# Patient Record
Sex: FEMALE | Race: WHITE | NOT HISPANIC OR LATINO | Employment: OTHER | URBAN - METROPOLITAN AREA
[De-identification: names, ages, dates, MRNs, and addresses within clinical notes are randomized per-mention and may not be internally consistent; named-entity substitution may affect disease eponyms.]

---

## 2023-11-09 ENCOUNTER — OFFICE VISIT (OUTPATIENT)
Dept: ORTHOPEDIC SURGERY | Facility: HOSPITAL | Age: 73
End: 2023-11-09
Payer: MEDICARE

## 2023-11-09 DIAGNOSIS — M25.812 SHOULDER IMPINGEMENT, LEFT: Primary | ICD-10-CM

## 2023-11-09 DIAGNOSIS — M75.22 BICEPS TENDINITIS OF LEFT SHOULDER: ICD-10-CM

## 2023-11-09 PROCEDURE — 20550 NJX 1 TENDON SHEATH/LIGAMENT: CPT | Performed by: ORTHOPAEDIC SURGERY

## 2023-11-09 PROCEDURE — 99213 OFFICE O/P EST LOW 20 MIN: CPT | Performed by: ORTHOPAEDIC SURGERY

## 2023-11-09 PROCEDURE — 76942 ECHO GUIDE FOR BIOPSY: CPT | Performed by: ORTHOPAEDIC SURGERY

## 2023-11-09 PROCEDURE — 1159F MED LIST DOCD IN RCRD: CPT | Performed by: ORTHOPAEDIC SURGERY

## 2023-11-09 PROCEDURE — 1160F RVW MEDS BY RX/DR IN RCRD: CPT | Performed by: ORTHOPAEDIC SURGERY

## 2023-11-09 PROCEDURE — 20611 DRAIN/INJ JOINT/BURSA W/US: CPT | Performed by: ORTHOPAEDIC SURGERY

## 2023-11-09 PROCEDURE — 1036F TOBACCO NON-USER: CPT | Performed by: ORTHOPAEDIC SURGERY

## 2023-11-10 RX ORDER — TRIAMCINOLONE ACET/0.9%NACL/PF 40 MG/ML
10 VIAL (ML) INJECTION ONCE
Status: SHIPPED | OUTPATIENT
Start: 2023-11-10

## 2023-11-10 RX ORDER — METHYLPREDNISOLONE ACETATE 40 MG/ML
40 INJECTION, SUSPENSION INTRA-ARTICULAR; INTRALESIONAL; INTRAMUSCULAR; SOFT TISSUE ONCE
Status: SHIPPED | OUTPATIENT
Start: 2023-11-10

## 2023-11-10 ASSESSMENT — ENCOUNTER SYMPTOMS
JOINT SWELLING: 1
EYE DISCHARGE: 0
COLOR CHANGE: 0
TROUBLE SWALLOWING: 0
SHORTNESS OF BREATH: 0
WHEEZING: 0
CHILLS: 0
FEVER: 0

## 2023-11-10 NOTE — PROGRESS NOTES
Reason for Appointment  Recurrent L shoulder pain     History of Present Illness  Patient is a 73 y.o. female here today for follow-up evaluation of recurrent left shoulder pain. She had a previous subacromial injection 11 months ago that did give her good relief up until a few months ago. Shoulder pain has slowly returned and is worse with lifting and overhead activity. She is very active. She has anterior and lateral sided pain. No numbness down the arm. No other changes in her PMH, allergies, or medications     History reviewed. No pertinent past medical history.    History reviewed. No pertinent surgical history.    Medication Documentation Review Audit       Reviewed by Tessy Greene PA-C (Physician Assistant) on 11/10/23 at 0718      Medication Order Taking? Sig Documenting Provider Last Dose Status            No Medications to Display                                   Allergies   Allergen Reactions    Penicillins GI Upset and Unknown       Review of Systems   Constitutional:  Negative for chills and fever.   HENT:  Positive for hearing loss. Negative for trouble swallowing.    Eyes:  Negative for discharge.   Respiratory:  Negative for shortness of breath and wheezing.    Cardiovascular:  Negative for chest pain.   Musculoskeletal:  Positive for joint swelling.   Skin:  Negative for color change and pallor.   All other systems reviewed and are negative.    Exam   On exam the left shoulder is lacking about 10 degrees of full active forward flexion. She has positive impingement signs and a functional deltoid. Good cuff strength with resisted external rotation. Tender anteriorly over the biceps tendon sheath. Pain with an open palm resistance test. Good pulses and sensation in the upper extremity    Assessment   Left shoulder impingement  Left biceps tendinitis    Plan   We sterilely injected under US guidance Kenalog and lidocaine into the left shoulder subacromial space and depo-medrol and lidocaine into  the left biceps tendon sheath. Patient understands the small risk of infection and the signs to look for as well as flare reaction. Hopefully these give her good relief. She can follow up with us as needed    Written by Tessy Marsh saw, evaluated, and treated the patient with the PA

## 2024-05-29 ENCOUNTER — OFFICE VISIT (OUTPATIENT)
Dept: ORTHOPEDIC SURGERY | Facility: HOSPITAL | Age: 74
End: 2024-05-29
Payer: MEDICARE

## 2024-05-29 ENCOUNTER — HOSPITAL ENCOUNTER (OUTPATIENT)
Dept: RADIOLOGY | Facility: HOSPITAL | Age: 74
Discharge: HOME | End: 2024-05-29
Payer: MEDICARE

## 2024-05-29 DIAGNOSIS — M25.552 PAIN IN LEFT HIP: ICD-10-CM

## 2024-05-29 DIAGNOSIS — M76.892 HIP ABDUCTOR TENDONITIS, LEFT: Primary | ICD-10-CM

## 2024-05-29 PROCEDURE — 1159F MED LIST DOCD IN RCRD: CPT | Performed by: FAMILY MEDICINE

## 2024-05-29 PROCEDURE — 99213 OFFICE O/P EST LOW 20 MIN: CPT | Performed by: FAMILY MEDICINE

## 2024-05-29 PROCEDURE — 73502 X-RAY EXAM HIP UNI 2-3 VIEWS: CPT | Mod: LEFT SIDE | Performed by: RADIOLOGY

## 2024-05-29 PROCEDURE — 99203 OFFICE O/P NEW LOW 30 MIN: CPT | Performed by: FAMILY MEDICINE

## 2024-05-29 PROCEDURE — 73502 X-RAY EXAM HIP UNI 2-3 VIEWS: CPT | Mod: LT

## 2024-05-29 RX ORDER — ATORVASTATIN CALCIUM 10 MG/1
10 TABLET, FILM COATED ORAL DAILY
COMMUNITY
Start: 2024-03-26

## 2024-05-29 RX ORDER — GABAPENTIN 100 MG/1
200 CAPSULE ORAL NIGHTLY
COMMUNITY
Start: 2024-03-14

## 2024-05-29 ASSESSMENT — PAIN - FUNCTIONAL ASSESSMENT: PAIN_FUNCTIONAL_ASSESSMENT: 0-10

## 2024-05-29 NOTE — PROGRESS NOTES
Sports Medicine Office Note    Today's Date:  05/29/2024     HPI: Hannah Wellington is a 74 y.o. retired and affordable housing who presents today for left hip pain.    5/29/2021: She has posterior lateral hip pain that she states has been since childhood.  No trauma, but she has been managing it with stretches and exercises, she feels better.  Is the first time she is having her evaluated by a medical professional.  She is very active, she paints, gardens, and runs experience up to 2 miles a day.  This is not affected any of his activities, but the recent housework has aggravated this pain which is why she is here today.  She has tried hip flexor exercises, but no medications.  She states sometimes she does get bilateral low back pain, denies any numbness or tingling.    Physical Examination:     The Left hip and pelvis are without obvious signs of acute bony deformity or instability.  Tenderness over the gluteal fossa.  Active and passive range of motion are full and painless. Log roll is negative. Straight leg raise test is negative. Yordy is negative. Crossover is negative. Hip strength is strong as compared to the opposite hip. The opposite hip is otherwise nontender and stable. Gait is non-antalgic and tandem.    Imaging:  Radiographs of the left hip were reviewed and revealed minimal osteoarthritic changes of the bilateral hips.  The studies were reviewed by Dr. Dougherty in the office today.    Problem List Items Addressed This Visit    None  Visit Diagnoses         Codes    Hip abductor tendonitis, left    -  Primary M76.892    Relevant Orders    Referral to Physical Therapy    Pain in left hip     M25.552    Relevant Orders    XR hip left with pelvis when performed 2 or 3 views    Referral to Physical Therapy            Assessment and Plan:     We reviewed the exam and x-ray findings and discussed the conservative and surgical treatment options. We agreed to treat her with with  gluteal strain/tendinitis  maximizing prescription doses of Voltaren gel and formal physical therapy.  Home exercise program given in the meantime.  She should follow-up as needed.    **This note was dictated using Dragon speech recognition software and was not corrected for spelling or grammatical errors**.    Derick Lorenz DO  Sports Medicine Fellow  [facility] [place]

## 2024-06-27 ENCOUNTER — OFFICE VISIT (OUTPATIENT)
Dept: ORTHOPEDIC SURGERY | Facility: HOSPITAL | Age: 74
End: 2024-06-27
Payer: MEDICARE

## 2024-06-27 DIAGNOSIS — M75.22 BICEPS TENDINITIS OF LEFT SHOULDER: ICD-10-CM

## 2024-06-27 DIAGNOSIS — M25.812 SHOULDER IMPINGEMENT, LEFT: Primary | ICD-10-CM

## 2024-06-27 PROCEDURE — 1159F MED LIST DOCD IN RCRD: CPT | Performed by: ORTHOPAEDIC SURGERY

## 2024-06-27 PROCEDURE — 76942 ECHO GUIDE FOR BIOPSY: CPT | Performed by: ORTHOPAEDIC SURGERY

## 2024-06-27 PROCEDURE — 2500000004 HC RX 250 GENERAL PHARMACY W/ HCPCS (ALT 636 FOR OP/ED): Performed by: ORTHOPAEDIC SURGERY

## 2024-06-27 PROCEDURE — 20550 NJX 1 TENDON SHEATH/LIGAMENT: CPT | Mod: LT | Performed by: ORTHOPAEDIC SURGERY

## 2024-06-27 PROCEDURE — 2500000005 HC RX 250 GENERAL PHARMACY W/O HCPCS: Performed by: ORTHOPAEDIC SURGERY

## 2024-06-27 PROCEDURE — 1160F RVW MEDS BY RX/DR IN RCRD: CPT | Performed by: ORTHOPAEDIC SURGERY

## 2024-06-27 PROCEDURE — 99213 OFFICE O/P EST LOW 20 MIN: CPT | Performed by: ORTHOPAEDIC SURGERY

## 2024-06-27 PROCEDURE — 20611 DRAIN/INJ JOINT/BURSA W/US: CPT | Mod: LT | Performed by: ORTHOPAEDIC SURGERY

## 2024-06-27 PROCEDURE — 1036F TOBACCO NON-USER: CPT | Performed by: ORTHOPAEDIC SURGERY

## 2024-06-27 PROCEDURE — 1125F AMNT PAIN NOTED PAIN PRSNT: CPT | Performed by: ORTHOPAEDIC SURGERY

## 2024-06-27 ASSESSMENT — PAIN SCALES - GENERAL: PAINLEVEL_OUTOF10: 3

## 2024-06-27 ASSESSMENT — PAIN - FUNCTIONAL ASSESSMENT: PAIN_FUNCTIONAL_ASSESSMENT: 0-10

## 2024-06-28 RX ORDER — METHYLPREDNISOLONE ACETATE 40 MG/ML
30 INJECTION, SUSPENSION INTRA-ARTICULAR; INTRALESIONAL; INTRAMUSCULAR; SOFT TISSUE
Status: COMPLETED | OUTPATIENT
Start: 2024-06-27 | End: 2024-06-27

## 2024-06-28 RX ORDER — LIDOCAINE HYDROCHLORIDE 10 MG/ML
3 INJECTION INFILTRATION; PERINEURAL
Status: COMPLETED | OUTPATIENT
Start: 2024-06-27 | End: 2024-06-27

## 2024-06-28 RX ORDER — LIDOCAINE HYDROCHLORIDE 10 MG/ML
2 INJECTION INFILTRATION; PERINEURAL
Status: COMPLETED | OUTPATIENT
Start: 2024-06-27 | End: 2024-06-27

## 2024-06-28 ASSESSMENT — ENCOUNTER SYMPTOMS
SHORTNESS OF BREATH: 0
ARTHRALGIAS: 1
JOINT SWELLING: 0
SINUS PAIN: 0
FEVER: 0
WHEEZING: 0
CHILLS: 0
TROUBLE SWALLOWING: 0
EYE DISCHARGE: 0
WOUND: 0

## 2024-06-28 NOTE — PROGRESS NOTES
Reason for Appointment  Chief Complaint   Patient presents with    Left Shoulder - Injections     History of Present Illness  Patient is a 74 y.o. female here today for follow-up evaluation of recurrent left shoulder pain.  She had injections back in November that did give her good relief up until a few weeks ago.  Pain has returned over the anterior and lateral aspects of the shoulder worse with overhead activities and lifting.  She is still very active and is not interested in any surgery.  No other changes in her past medical history, allergies, or medications.     History reviewed. No pertinent past medical history.    History reviewed. No pertinent surgical history.    Medication Documentation Review Audit       Reviewed by Tessy Grenee PA-C (Physician Assistant) on 06/28/24 at 0751      Medication Order Taking? Sig Documenting Provider Last Dose Status   atorvastatin (Lipitor) 10 mg tablet [id number] Yes Take 1 tablet (10 mg) by mouth once daily. Historical Provider, MD Taking Active   gabapentin (Neurontin) 100 mg capsule [id number] Yes Take 2 capsules (200 mg) by mouth once daily at bedtime. Historical Provider, MD Taking Active   methylPREDNISolone acetate (DEPO-Medrol) injection 40 mg [id number]   Tessy Greene PA-C  Active   triamcinol ac (PF) in 0.9%NaCl (KENALOG) injection 10 mg [id number]   Tessy Greene PA-C  Active                    Allergies   Allergen Reactions    Penicillins GI Upset and Unknown       Review of Systems   Constitutional:  Negative for chills and fever.   HENT:  Negative for hearing loss, sinus pain and trouble swallowing.    Eyes:  Negative for discharge.   Respiratory:  Negative for shortness of breath and wheezing.    Cardiovascular:  Negative for chest pain.   Musculoskeletal:  Positive for arthralgias. Negative for joint swelling.   Skin:  Negative for rash and wound.   All other systems reviewed and are negative.    Exam   On exam the left shoulder shows mild pain  with full active forward flexion.  She has markedly positive impingement signs on the left but fairly good cuff strength with resisted external rotation.  Tenderness anteriorly over the left biceps tendon sheath.  Deltoid is functional.  Pain with an open palm resistance test.  Good pulses and sensation in the upper extremity.    Assessment   Left shoulder impingement  Left biceps tendinitis    Plan   She would like repeat injections today, we sterilely injected under ultrasound guidance Kenalog and lidocaine into the left shoulder subacromial space and Depo-Medrol lidocaine into the left biceps tendon sheath.  Patient understands the small risk of infection and the signs look for as well as flare action.  Hopefully these give her good relief.  She can follow-up with us as needed.    L Inj/Asp: L subacromial bursa on 6/27/2024 1:53 PM  Indications: pain  Details: 22 G needle, ultrasound-guided  Medications: 3 mL lidocaine 10 mg/mL (1 %); 30 mg triamcinolone acetonide 10 mg/mL  Outcome: tolerated well, no immediate complications    After discussing the risks and benefits of the procedure with proceeded with an injection.  Using ultrasound guidance we identified the acromion, humeral head and the subacromial bursa, images obtained. We then sterilely injected the left subacrominal space with a mixture of 30 mg of Kenalog and 2 cc of 1 % lidocaine. Pt tolerated the procedure well without any adverse reactions.   Procedure, treatment alternatives, risks and benefits explained, specific risks discussed. Consent was given by the patient. Immediately prior to procedure a time out was called to verify the correct patient, procedure, equipment, support staff and site/side marked as required. Patient was prepped and draped in the usual sterile fashion.       Tendon Sheath Injection: left long head of biceps tendon sheath on 6/27/2024 1:54 PM  Indications: pain  Details: 25 G needle, ultrasound-guided  Medications: 2 mL  lidocaine 10 mg/mL (1 %); 30 mg methylPREDNISolone acetate 40 mg/mL  Outcome: tolerated well, no immediate complications    After discussing the risks and benefits of the procedure with proceeded with an injection. Using ultrasound guidance we identified the greater and lesser tuberosities and the biceps tendons sheath, images saved. We then sterilely injected the left biceps tendon sheath with a mixture of 30 mg of Depo-Medrol and 2 cc of 1 % lidocaine. Pt tolerated the procedure well without any adverse reactions    Procedure, treatment alternatives, risks and benefits explained, specific risks discussed. Consent was given by the patient. Immediately prior to procedure a time out was called to verify the correct patient, procedure, equipment, support staff and site/side marked as required. Patient was prepped and draped in the usual sterile fashion.       Written by Tessy Marsh saw, evaluated, and treated the patient with the PA

## 2024-07-09 ENCOUNTER — EVALUATION (OUTPATIENT)
Dept: PHYSICAL THERAPY | Facility: CLINIC | Age: 74
End: 2024-07-09
Payer: MEDICARE

## 2024-07-09 DIAGNOSIS — G89.29 CHRONIC LEFT HIP PAIN: Primary | ICD-10-CM

## 2024-07-09 DIAGNOSIS — M25.552 CHRONIC LEFT HIP PAIN: Primary | ICD-10-CM

## 2024-07-09 DIAGNOSIS — M25.552 PAIN IN LEFT HIP: ICD-10-CM

## 2024-07-09 DIAGNOSIS — M76.892 HIP ABDUCTOR TENDONITIS, LEFT: ICD-10-CM

## 2024-07-09 PROCEDURE — 97110 THERAPEUTIC EXERCISES: CPT | Mod: GP | Performed by: PHYSICAL THERAPIST

## 2024-07-09 PROCEDURE — 97161 PT EVAL LOW COMPLEX 20 MIN: CPT | Mod: GP | Performed by: PHYSICAL THERAPIST

## 2024-07-09 PROCEDURE — 97535 SELF CARE MNGMENT TRAINING: CPT | Mod: GP | Performed by: PHYSICAL THERAPIST

## 2024-07-09 ASSESSMENT — PAIN SCALES - GENERAL: PAINLEVEL_OUTOF10: 3

## 2024-07-09 ASSESSMENT — PAIN - FUNCTIONAL ASSESSMENT: PAIN_FUNCTIONAL_ASSESSMENT: 0-10

## 2024-07-09 NOTE — PROGRESS NOTES
"  Physical Therapy  Physical Therapy Orthopedic Evaluation    Patient Name: Hannah Wellington \"Colleen\"  MRN: [id number]  Today's Date: 7/9/2024  Time Calculation  Start Time: 1600  Stop Time: 1643  Time Calculation (min): 43 min    Insurance:  Visit number: 1 of [place]  Authorization info: NAN  Insurance Type: Medicare and [place]  Cert start date: 7/9/24; Cert end date: 10/7/24     General:  Reason for visit:   M25.552 (ICD-10-CM) - Pain in left hip   M76.892 (ICD-10-CM) - Hip abductor tendonitis, left     Referred by: Dr. Kristian Dougherty    Assessment: Patient presents with signs and symptoms consistent with gluteal tendinopathy L>R, resulting in limited participation in pain-free ADLs and inability to perform at their prior level of function. Pt would benefit from physical therapy to address the impairments found & listed previously in the objective section in order to return to safe and pain-free ADLs and prior level of function.       Prognosis: Good  Clinical Presentation: Stable  Eval Complexity: Low    Plan:     Planned Interventions include: therapeutic exercise, self-care home management, manual therapy, therapeutic activities, gait training, neuromuscular coordination, vasopneumatic, dry needling, aquatic therapy  Frequency: 1 x Week  Duration: 8 Weeks    Patient and/or family understands and agrees with goals and plan documented.    Current Problem:  1. Chronic left hip pain  Follow Up In Physical Therapy      2. Pain in left hip  Referral to Physical Therapy      3. Hip abductor tendonitis, left  Referral to Physical Therapy          Precautions:   Precautions  STEADI Fall Risk Score (The score of 4 or more indicates an increased risk of falling): 0  Precautions Comment: Recent parathyroid surgery      Medical History Form: Reviewed (scanned into chart)    Subjective:   Subjective   Chief Complaint: Pt presents to PT with longstanding h/o L hip pain, recently aggravated while painting outside of home, required her " "transferring in/out of window early June. Further aggravated after driving in car 8 hours 10 days later. States she could not move, hips locking up, after transferring out of car. Now presents to PT with B proximal buttock pain (R sided pain beginning yesterday). Lives very active lifestyle, including running 2 miles/d, swimming, gardening, painting. Recent visit with Dr. Dougherty, dx with gluteal strain/tendinitis, referred to PT and using voltaren gel. Recent parathyroid surgery, only precautions no swimming and lifting <5 lb.   Onset: June  NEERU: Increased activity while painting house    Current Condition:   Same    Pain:  Pain Assessment: 0-10  0-10 (Numeric) Pain Score: 3  Location: B proximal buttock   Description: \"electric\", \"locking up\"; denies n/t  Aggravating Factors:  transitional movements; standing after sitting long duration, specifically deeper seats; twisting hip movements  Relieving Factors:  Alleve, piriformis stretch, SKTC, LTR  Pain Intensity: 0/10-3/10    Relevant Information (PMH & Previous Tests/Imaging): HLD, Gabapentin (to improve sleep)  L Hip XR: Moderate osteoarthritis bilateral hips and sacroiliac joints with no acute findings left hip.   Previous Interventions/Treatments: None    Prior Level of Function (PLOF)  Patient previously independent with all ADLs  Exercise/Physical Activity: Runs intervals 2 miles on dirt track, 3x/week; walking 1 hour 4x/week, biking regularly, tennis, swimming, gardening  Work/School: No    Patients Living Environment: Reviewed and no concern    Primary Language: English    Patient's Goal(s) for Therapy: \"Learn exercises to reduce pain\"    Red Flags: Do you have any of the following? No  Fever/chills, unexplained weight changes, dizziness/fainting, unexplained change in bowel or bladder functions, unexplained malaise or muscle weakness, night pain/sweats, numbness or tingling    Objective:  Objective     Posture: PPT, rounded shoulders, iliac crest = height, " decreased knee ext R>L    Palpation: R/L PSIS, R/L glut min/piriformis    L/s AROM  Flexion: 75% (HS tightness)  Ext: 25%  SB: R 50%, L 50%  ROT: R 50%, L 50%    Hip AROM  Flexion R 125 deg (143 deg PROM); L 133 deg (142 deg PROM)  Abduction R 19 deg; L 21 deg  Extension R moderate limitations; L moderate limitations  ER R 25 deg; L 25 deg  IR R 25 deg; L 25 deg    Lower Extremity MMT  Hip Flexion R 4+/5; L 4/5  Hip Extension R 4/5; L 4-/5  Hip Abduction R 4/5; L 4-/5  Hip Adduction R 4/5; L 4-/5  Hip IR R 4/5; L 4/5  Hip ER R 4/5; L 4/5  Knee flexion R DNT; L DNT  Knee extension R 4+/5; L 4/5    LE Flexibility  HS: mod limitations R/L  RF: mod limitations R/L    Special Tests:  Scour (-)  FADIR (+)  AGUS (-)  Distraction (-)  SIJ Cluster (-)    Gait Analysis: decreased vickie, antalgic  Transfers: Increased weight shift RLE, requires increased time, requires use of UE    Balance:   SL Balance: R 20s; L 14s    Joint Mobility: diffusely hypomobile through hip and L/s    Outcome Measures:  Patient Specific Functional Scale (0 = unable to perform; 10 = able to perform activity at same level as before injury or problem)  Activity Current Follow Up Discharge   Walking 5     Tennis 0           Total score = sum of the activity scores/number of activities  Minimum detectable change (90%CI) for average score = 2 points  Minimum detectable change (90%CI) for single activity score = 3 points    EDUCATION: Home exercise program, plan of care, activity modifications, pain management, and injury pathology       Goals: Set and discussed today  Active       PT Problem       STG       Start:  07/09/24    Expected End:  08/23/24       Patient demonstrate home exercise program adherence to supplement symptom reduction and functional gains made in clinic          LTG       Start:  07/09/24    Expected End:  10/07/24       Patient will verbalize pain (0-10) <1/10 at worst to improve ADL tolerance   Patient will demonstrate gross L  hip strength 4/5 MMT to improve ease with functional mobility  Patient will perform STS transfer with appropriate LE alignment with minimal symptoms to improve ease with transfers  Pt will demonstrate MCID improvement on PSFS to demonstrate return to PLOF               Plan of care was developed with input and agreement by the patient    Treatment Performed:  HEP: Bridge, prone hip extension of table, SLR, SL clam, cat cow, piriformis stretch (fig 4 knee to opp chest)  Access Code [email or web address]    Charges:  Evaluation: low complexity  Treatment: 1 TE, 1 self care    Cindy Saeed, PT

## 2024-07-16 NOTE — PROGRESS NOTES
"Physical Therapy  Physical Therapy Treatment    Patient Name: Hannah Wellington  MRN: [id number]  Today's Date: 7/17/2024  Time Calculation  Start Time: 1330  Stop Time: 1412  Time Calculation (min): 42 min    Insurance:  Visit number: 2 of [place]  Authorization info: NAN  Insurance Type: Medicare and [place]  Cert start date: 7/9/24; Cert end date: 10/7/24      General:  Reason for visit:   M25.552 (ICD-10-CM) - Pain in left hip   M76.892 (ICD-10-CM) - Hip abductor tendonitis, left      Referred by: Dr. Kristian Dougherty    Assessment: Pt requiring moderate cuing to perform exercises with correct technique. Tolerating prescribed exercise well without increase in pain. Difficulty performing TA act without PPT/glut compensation, improving with cuing. At end of session, pt denies pain. Updating HEP and educating on performance.       Plan: Progress core/hip strength to tolerance. Add balance training.       Current Problem  1. Chronic left hip pain  Follow Up In Physical Therapy          Precautions:   Precautions  STEADI Fall Risk Score (The score of 4 or more indicates an increased risk of falling): 0  Precautions Comment: Recent parathyroid surgery (no swimming, no lifting >5 lb)    Subjective:  Subjective   Pt presents to PT without pain. Reports HEP adherence, believes the exercises are helping. No longer experiencing locking sensation. Reports continued pain with sitting long durations or bending at hip.    Pain  Pain Assessment: 0-10  0-10 (Numeric) Pain Score: 0 - No pain    Performing HEP?: Yes    Objective:  Objective   Ext lag present R/L    Treatments:  Exercise  - Upright bike 5'  - Cat cow x15  - Bridge 2x10  - SLR 2x10  - SL clam GTB 2x10  - Prone hip ext off table 2x10  - TA act 20x5\" holds  - BKFO x10 R/L  - Supine piriformis stretch x60\" R/L    HEP: Bridge, prone hip extension of table, SLR, SL clam, cat cow, piriformis stretch (fig 4 knee to opp chest)  Access Code [email or web address]    Charges: 3 TE    Cindy " Darlin, PT

## 2024-07-17 ENCOUNTER — TREATMENT (OUTPATIENT)
Dept: PHYSICAL THERAPY | Facility: CLINIC | Age: 74
End: 2024-07-17
Payer: MEDICARE

## 2024-07-17 DIAGNOSIS — M25.552 CHRONIC LEFT HIP PAIN: ICD-10-CM

## 2024-07-17 DIAGNOSIS — G89.29 CHRONIC LEFT HIP PAIN: ICD-10-CM

## 2024-07-17 PROCEDURE — 97110 THERAPEUTIC EXERCISES: CPT | Mod: GP | Performed by: PHYSICAL THERAPIST

## 2024-07-17 ASSESSMENT — PAIN SCALES - GENERAL: PAINLEVEL_OUTOF10: 0 - NO PAIN

## 2024-07-17 ASSESSMENT — PAIN - FUNCTIONAL ASSESSMENT: PAIN_FUNCTIONAL_ASSESSMENT: 0-10

## 2024-07-25 ENCOUNTER — TREATMENT (OUTPATIENT)
Dept: PHYSICAL THERAPY | Facility: CLINIC | Age: 74
End: 2024-07-25
Payer: MEDICARE

## 2024-07-25 DIAGNOSIS — G89.29 CHRONIC LEFT HIP PAIN: ICD-10-CM

## 2024-07-25 DIAGNOSIS — M25.552 CHRONIC LEFT HIP PAIN: ICD-10-CM

## 2024-07-25 PROCEDURE — 97112 NEUROMUSCULAR REEDUCATION: CPT | Mod: GP,CQ | Performed by: SPECIALIST/TECHNOLOGIST

## 2024-07-25 PROCEDURE — 97110 THERAPEUTIC EXERCISES: CPT | Mod: GP,CQ | Performed by: SPECIALIST/TECHNOLOGIST

## 2024-07-25 ASSESSMENT — PAIN SCALES - GENERAL: PAINLEVEL_OUTOF10: 0 - NO PAIN

## 2024-07-25 ASSESSMENT — PAIN - FUNCTIONAL ASSESSMENT: PAIN_FUNCTIONAL_ASSESSMENT: 0-10

## 2024-07-25 NOTE — PROGRESS NOTES
"Physical Therapy  Physical Therapy Treatment    Patient Name: Hannah Wellington  MRN: [id number]  Today's Date: 7/25/2024  Time Calculation  Start Time: 1345  Stop Time: 1430  Time Calculation (min): 45 min    Insurance:  Visit number: 3 of [place]  Authorization info: NAN  Insurance Type: Medicare and [place]  Cert start date: 7/9/24; Cert end date: 10/7/24      General:  Reason for visit:   M25.552 (ICD-10-CM) - Pain in left hip   M76.892 (ICD-10-CM) - Hip abductor tendonitis, left      Referred by: Dr. Kristian Dougherty    Current Problem  1. Chronic left hip pain  Follow Up In Physical Therapy          Precautions:   Precautions  STEADI Fall Risk Score (The score of 4 or more indicates an increased risk of falling): 0  Precautions Comment: Recent parathyroid surgery (no swimming, no lifting >5 lb)    Subjective:  Subjective   Pt presents to PT without pain. Reports HEP adherence, believes the exercises are helping. No longer experiencing locking sensation. Reports continued pain with sitting long durations or bending at hip.    Pain  Pain Assessment: 0-10  0-10 (Numeric) Pain Score: 0 - No pain    Performing HEP?: Yes    Objective:  Objective   Ext lag present R/L  + L anterior innominate   TTP L proximal rectus  Hip ext 1/2\"   MMT hip ext R 4+ L 4     Treatments:  Exercise  - Nu Step L2 5 min   - 4 kg KB narrow base & swing cw/ccw 10x R/L   - 8 kg KB ISO SB R/L 1'   - Bravo 7.5# iso rot R/L 1'   - Hip ext R/L 44#/44# 20x   - hams 20# 20x  - SS hams R/L 1'   - SB LTR 20x, Hams 20x   - TA brace 10x, Brace & MIP 10x, Brace & SLR 5x R/L   - SS R/ HF 1'     Access Code [email or web address]    Charges: TE x2, NME     Assessment: Patient tolerated intensity with mild fatigue noting good effort of strengthening.         Plan: Continue to improve core stability/strength, flexibility and balance to improve gait and ADL           Aryan Guerin, PTA  "

## 2024-08-06 ENCOUNTER — TREATMENT (OUTPATIENT)
Dept: PHYSICAL THERAPY | Facility: CLINIC | Age: 74
End: 2024-08-06
Payer: MEDICARE

## 2024-08-06 DIAGNOSIS — G89.29 CHRONIC LEFT HIP PAIN: ICD-10-CM

## 2024-08-06 DIAGNOSIS — M25.552 CHRONIC LEFT HIP PAIN: ICD-10-CM

## 2024-08-06 PROCEDURE — 97112 NEUROMUSCULAR REEDUCATION: CPT | Mod: GP,CQ | Performed by: SPECIALIST/TECHNOLOGIST

## 2024-08-06 PROCEDURE — 97110 THERAPEUTIC EXERCISES: CPT | Mod: GP,CQ | Performed by: SPECIALIST/TECHNOLOGIST

## 2024-08-06 NOTE — PROGRESS NOTES
"Physical Therapy  Physical Therapy Treatment    Patient Name: Hannah Wellington  MRN: [id number]  Today's Date: 8/6/2024  Time Calculation  Start Time: 1030  Stop Time: 1115  Time Calculation (min): 45 min    Insurance:  Visit number: 3 of [place]  Authorization info: NAN  Insurance Type: Medicare and [place]  Cert start date: 7/9/24; Cert end date: 10/7/24      General:  Reason for visit:   M25.552 (ICD-10-CM) - Pain in left hip   M76.892 (ICD-10-CM) - Hip abductor tendonitis, left      Referred by: Dr. Kristian Dougherty    Current Problem  1. Chronic left hip pain  Follow Up In Physical Therapy          Precautions:        Subjective:  Subjective   Patient arrived full weight bearing noting having a mild set back 3 nights after last session where her R back & hip tightened up.  Patient notes no pain on arrival this AM.      Pain       Performing HEP?: Yes    Objective:  Objective   Ext lag present R/L  + L anterior innominate  LLD 1/4\", with long sit 1/2\"   TTP L proximal rectus  Hip ext 1/2\"   MMT hip ext R 4+ L 4     Treatments:  Exercise  - Nu Step L2 5 min   - 1/8\" lift on R   - DBE 2x2 min  - valslide HIR/HER 15x R/L/B    - 4 kg KB narrow base & swing cw/ccw 10x R/L   - 8 kg KB ISO SB R/L 1'   - Bravo 7.5# iso rot R/L 1'   - Hip ext R/L 44#/44# 20x   - hams 20# 20x  - SS hams R/L 1'   - SB LTR 20x, Hams 20x   - TA brace 10x, Brace & MIP 10x, Brace & SLR 5x R/L   - SS R/ HF 1'     Access Code [email or web address]    Charges: TE x2, NME     Assessment: Patient tolerated intensity with less fatigue.  Patient noted less back soreness post treatment.          Plan: Continue to improve core stability/strength, flexibility and balance to improve gait and ADL           Aryan Guerin, PTA  "

## 2024-08-07 ENCOUNTER — APPOINTMENT (OUTPATIENT)
Dept: PHYSICAL THERAPY | Facility: CLINIC | Age: 74
End: 2024-08-07
Payer: MEDICARE

## 2024-08-14 ENCOUNTER — APPOINTMENT (OUTPATIENT)
Dept: PHYSICAL THERAPY | Facility: CLINIC | Age: 74
End: 2024-08-14
Payer: MEDICARE

## 2024-08-23 ENCOUNTER — APPOINTMENT (OUTPATIENT)
Dept: PHYSICAL THERAPY | Facility: CLINIC | Age: 74
End: 2024-08-23
Payer: MEDICARE

## 2024-08-28 ENCOUNTER — TREATMENT (OUTPATIENT)
Dept: PHYSICAL THERAPY | Facility: CLINIC | Age: 74
End: 2024-08-28
Payer: MEDICARE

## 2024-08-28 DIAGNOSIS — M25.552 CHRONIC LEFT HIP PAIN: ICD-10-CM

## 2024-08-28 DIAGNOSIS — G89.29 CHRONIC LEFT HIP PAIN: ICD-10-CM

## 2024-08-28 PROCEDURE — 97112 NEUROMUSCULAR REEDUCATION: CPT | Mod: GP,CQ | Performed by: SPECIALIST/TECHNOLOGIST

## 2024-08-28 PROCEDURE — 97110 THERAPEUTIC EXERCISES: CPT | Mod: GP,CQ | Performed by: SPECIALIST/TECHNOLOGIST

## 2024-08-28 ASSESSMENT — PAIN - FUNCTIONAL ASSESSMENT: PAIN_FUNCTIONAL_ASSESSMENT: 0-10

## 2024-08-28 ASSESSMENT — PAIN SCALES - GENERAL: PAINLEVEL_OUTOF10: 0 - NO PAIN

## 2024-08-28 NOTE — PROGRESS NOTES
"Physical Therapy  Physical Therapy Treatment    Patient Name: Hannah Wellington  MRN: [id number]  Today's Date: 8/28/2024  Time Calculation  Start Time: 1330  Stop Time: 1415  Time Calculation (min): 45 min    Insurance:  Visit number: 4 of [place]  Authorization info: NAN  Insurance Type: Medicare and [place]  Cert start date: 7/9/24; Cert end date: 10/7/24      General:  Reason for visit:   M25.552 (ICD-10-CM) - Pain in left hip   M76.892 (ICD-10-CM) - Hip abductor tendonitis, left      Referred by: Dr. Kristian Dougherty    Current Problem  1. Chronic left hip pain  Follow Up In Physical Therapy          Precautions:   Precautions  STEADI Fall Risk Score (The score of 4 or more indicates an increased risk of falling): 0  Precautions Comment: Recent parathyroid surgery (no swimming, no lifting >5 lb)    Subjective:  Subjective   Patient arrived full weight bearing noting she has improved in general although still can become painful when she overdoes activity.      Pain  Pain Assessment: 0-10  0-10 (Numeric) Pain Score: 0 - No pain    Performing HEP?: Yes    Objective:  Objective   Ext lag present R/L  + L anterior innominate  LLD 1/4\", with long sit 1/2\"   TTP L proximal rectus  Hip ext 1/2\"   MMT hip ext R 4+ L 4     Treatments:  Exercise  Stepper L2 5 min   - DBE 2x2 min  - valslide HIR/HER 15x R/L/B    - 4 kg KB narrow base & swing cw/ccw 10x R/L   - 8 kg KB ISO SB R/L 1'   - Bravo 7.5# iso rot R/L 1'   - Hip ext R/L 44#/44# 20x   - hams 20# 20x  - SS hams R/L 1'   - SB LTR 20x, Hams 20x   - TA brace 10x, Brace & MIP 10x, Brace & SLR 5x R/L (with pressure BFBK)  - SS R/ HF 1'     Access Code [email or web address]    Charges: TE x2, NME     Assessment: Patient tolerated increased intensity with minimal fatigue.  Patient notes she is feeling stronger.         Plan: Continue to improve core stability/strength, flexibility and balance to improve gait and ADL        Aryan Guerin, PTA  "

## 2024-08-30 ENCOUNTER — APPOINTMENT (OUTPATIENT)
Dept: PHYSICAL THERAPY | Facility: CLINIC | Age: 74
End: 2024-08-30
Payer: MEDICARE

## 2024-09-04 ENCOUNTER — APPOINTMENT (OUTPATIENT)
Dept: PHYSICAL THERAPY | Facility: CLINIC | Age: 74
End: 2024-09-04
Payer: MEDICARE

## 2024-09-11 ENCOUNTER — TREATMENT (OUTPATIENT)
Dept: PHYSICAL THERAPY | Facility: CLINIC | Age: 74
End: 2024-09-11
Payer: MEDICARE

## 2024-09-11 DIAGNOSIS — M25.552 CHRONIC LEFT HIP PAIN: ICD-10-CM

## 2024-09-11 DIAGNOSIS — G89.29 CHRONIC LEFT HIP PAIN: ICD-10-CM

## 2024-09-11 PROCEDURE — 97110 THERAPEUTIC EXERCISES: CPT | Mod: GP,CQ | Performed by: SPECIALIST/TECHNOLOGIST

## 2024-09-11 PROCEDURE — 97112 NEUROMUSCULAR REEDUCATION: CPT | Mod: GP,CQ | Performed by: SPECIALIST/TECHNOLOGIST

## 2024-09-11 ASSESSMENT — PAIN - FUNCTIONAL ASSESSMENT: PAIN_FUNCTIONAL_ASSESSMENT: 0-10

## 2024-09-11 ASSESSMENT — PAIN SCALES - GENERAL: PAINLEVEL_OUTOF10: 0 - NO PAIN

## 2024-09-11 NOTE — PROGRESS NOTES
"Physical Therapy  Physical Therapy Treatment    Patient Name: Hannah Wellington  MRN: [id number]  Today's Date: 9/11/2024  Time Calculation  Start Time: 1415  Stop Time: 1500  Time Calculation (min): 45 min    Insurance:  Visit number: 5 of [place]  Authorization info: NAN  Insurance Type: Medicare and [place]  Cert start date: 7/9/24; Cert end date: 10/7/24      General:  Reason for visit:   M25.552 (ICD-10-CM) - Pain in left hip   M76.892 (ICD-10-CM) - Hip abductor tendonitis, left      Referred by: Dr. Kritsian Dougherty    Current Problem  1. Chronic left hip pain  Follow Up In Physical Therapy          Precautions:   Precautions  STEADI Fall Risk Score (The score of 4 or more indicates an increased risk of falling): 0  Precautions Comment: Recent parathyroid surgery (no swimming, no lifting >5 lb)    Subjective:  Subjective   Patient arrived full weight bearing noting she has no pain on arrival and no soreness after last session.      Pain  Pain Assessment: 0-10  0-10 (Numeric) Pain Score: 0 - No pain    Performing HEP?: Yes    Objective:  Objective   Ext lag present R/L  No anterior innominate shift  LLD 1/4\"  TTP L proximal rectus  Hip ext 1/2\"   MMT hip ext R 4+ L 4     Treatments:  Exercise  Stepper L2 5 min   DBE 2x2 min  - valslide HIR/HER 15x R/L/B    - 4 kg KB narrow base & swing cw/ccw 10x R/L   - 8 kg KB ISO SB R/L 1'   - Bravo 7.5# iso rot R/L 1'   - Hip ext R/L 55#/55# 20x   - Hip flex R/L 22#/22# 20x  - hams 20# 20x  - SS hams R/L 1'   - SB LTR 20x, Hams 20x   - TA brace 10x, Brace & MIP 10x, Brace & SLR 5x R/L (with pressure BFBK)  - SS R/ HF 1'     Access Code [email or web address]    Charges: TE x2, NME     Assessment: Patient tolerated increased intensity with minimal fatigue.  Patient notes she is feeling stronger.         Plan: Continue to improve core stability/strength, flexibility and balance to improve gait and ADL  Patient to schedule 2 more strengthening sessions followed by a re-check with Cindy Saeed " PT      Aryan Guerin, PTA

## 2024-09-24 ENCOUNTER — OFFICE VISIT (OUTPATIENT)
Dept: ORTHOPEDIC SURGERY | Facility: HOSPITAL | Age: 74
End: 2024-09-24
Payer: MEDICARE

## 2024-09-24 DIAGNOSIS — M16.12 PRIMARY OSTEOARTHRITIS OF LEFT HIP: ICD-10-CM

## 2024-09-24 DIAGNOSIS — M67.952 TENDINOPATHY OF LEFT GLUTEAL REGION: Primary | ICD-10-CM

## 2024-09-24 PROCEDURE — 1159F MED LIST DOCD IN RCRD: CPT | Performed by: STUDENT IN AN ORGANIZED HEALTH CARE EDUCATION/TRAINING PROGRAM

## 2024-09-24 PROCEDURE — 99213 OFFICE O/P EST LOW 20 MIN: CPT | Performed by: STUDENT IN AN ORGANIZED HEALTH CARE EDUCATION/TRAINING PROGRAM

## 2024-09-24 PROCEDURE — 99213 OFFICE O/P EST LOW 20 MIN: CPT | Mod: GC | Performed by: STUDENT IN AN ORGANIZED HEALTH CARE EDUCATION/TRAINING PROGRAM

## 2024-09-24 NOTE — PROGRESS NOTES
REFERRAL SOURCE: No ref. provider found     CHIEF COMPLAINT: left hip pain    HISTORY OF PRESENT ILLNESS  Hannah Wellington is a very pleasant 74 y.o. female with history of HLD who is here for evaluation of left hip pain.     9/24/24: Reports that the pain started in May 2024. Thinks it was due to overworking as she had a lot of house projects going on at that time. She went to see Dr. Dougherty who recommended voltaren and PT. Patient reports she did PT for approximately two months which helps with her pain significantly. She is back to running and playing tennis. She reports that she is approximately 75% better. Deep tissue massage has helped as well. Denies fevers, chills, night sweats. No red flag symptoms.    MEDS    Current Outpatient Medications:     atorvastatin (Lipitor) 10 mg tablet, Take 1 tablet (10 mg) by mouth once daily., Disp: , Rfl:     gabapentin (Neurontin) 100 mg capsule, Take 2 capsules (200 mg) by mouth once daily at bedtime., Disp: , Rfl:     Current Facility-Administered Medications:     methylPREDNISolone acetate (DEPO-Medrol) injection 40 mg, 40 mg, intramuscular, Once, Tessy Greene PA-C    triamcinol ac (PF) in 0.9%NaCl (KENALOG) injection 10 mg, 10 mg, intra-articular, Once, Tessy Greene PA-C    ALLERGIES  Allergies   Allergen Reactions    Penicillins GI Upset and Unknown       PAST MEDICAL HISTORY  No past medical history on file.    PAST SURGICAL HISTORY  No past surgical history on file.    SOCIAL HISTORY   Social History     Socioeconomic History    Marital status: Single     Spouse name: Not on file    Number of children: Not on file    Years of education: Not on file    Highest education level: Not on file   Occupational History    Not on file   Tobacco Use    Smoking status: Never    Smokeless tobacco: Never   Substance and Sexual Activity    Alcohol use: Yes    Drug use: Defer    Sexual activity: Defer   Other Topics Concern    Not on file   Social History Narrative     Not on file     Social Determinants of Health     Financial Resource Strain: Low Risk  (7/13/2020)    Received from [facility]    Overall Financial Resource Strain (CARDIA)     Difficulty of Paying Living Expenses: Not hard at all   Food Insecurity: No Food Insecurity (11/7/2023)    Received from [facility]    Hunger Vital Sign     Worried About Running Out of Food in the Last Year: Never true     Ran Out of Food in the Last Year: Never true   Transportation Needs: No Transportation Needs (11/7/2023)    Received from [facility]    PRAPARE - Transportation     Lack of Transportation (Medical): No     Lack of Transportation (Non-Medical): No   Physical Activity: Sufficiently Active (11/7/2023)    Received from [facility]    Exercise Vital Sign     Days of Exercise per Week: 4 days     Minutes of Exercise per Session: 60 min   Stress: No Stress Concern Present (11/7/2023)    Received from [facility]    [employer] - Occupational Stress Questionnaire     Feeling of Stress : Not at all   Social Connections: Unknown (11/7/2023)    Received from [facility]    Social Connection and Isolation Panel [NHANES]     Frequency of Communication with Friends and Family: More than three times a week     Frequency of Social Gatherings with Friends and Family: More than three times a week     Attends [employer] Services: 1 to 4 times per year     Active Member of Clubs or Organizations: Yes     Attends Club or Organization Meetings: More than 4 times per year     Marital Status: Patient declined   Intimate Partner Violence: Not on file   Housing Stability: Unknown (11/7/2023)    Received from [facility]    Housing Stability Vital Sign     Unable to Pay for Housing in the Last Year: No     Number of Places Lived in the Last Year: Not on file      Unstable Housing in the Last Year: No       FAMILY HISTORY  No family history on file.    REVIEW OF SYSTEMS  Except for those mentioned in the history of present illness, and below, a complete review of systems is negative.     Review of Systems    VITALS  There were no vitals filed for this visit.    PHYSICAL EXAMINATION   GENERAL:  Awake, alert, and oriented, no apparent distress, pleasant, and cooperative  PSYC: Mood is euthymic, affect is congruent  EAR, NOSE, THROAT:  Normocephalic, atraumatic, moist membranes, anicteric sclera  LUNG: Nonlabored breathing  HEART: No clubbing or cyanosis  SKIN: No increased erythema, warmth, rashes, or concerning skin lesions  NEURO: Sensation is intact in the bilateral lower extremities. Strength is grossly 5 out of 5 throughout the bilateral lower extremities, unless noted below.  GAIT: Non-antalgic  MUSCULOSKELETAL: Examination of the left hip: Hip range of motion was full and mildly painful. Scour's and FAIR were negative. Stinchfield was negative. Yordy's was negative. Ganeslen's and P4 are negative. No tenderness to palpation over the greater trochanteric region, ASIS, AIIS, adductor tendons, ischial tuberosity. Tony's was negative.    IMAGING STUDIES:   Radiographs of the left hip dated 5/29/124 were personally reviewed and interpreted by me, Dr. Rosalee Aiken, and the findings shared with the patient.  Mild-moderate left hip osteoarthritis with mild SI joint arthritis.     IMPRESSION  #1  Acute exacerbation of chronic left hip osteoarthritis  #2 Left gluteal tendinopathy    PLAN  The following was discussed with the patient:     Hannah Wellington is a very pleasant 74 y.o. female with history of HLD who is here for evaluation of left hip pain.  We discussed that her pain is likely multifactorial and related to acute exacerbation of chronic left hip osteoarthritis as well as left gluteal tendinopathy.  She has been doing physical therapy with significant  "improvement in her pain.  She is requesting additional imaging to \"know exactly what is going on\".  We reviewed her x-rays in detail today and she took several pictures on her phone as I explained the x-ray findings.  She is requesting an MRI of the hip, which was ordered today.  We discussed that the best treatment option is to continue with physical therapy and I anticipate that she will continue to make progress.  We discussed that I will be leaving .  She reports that she already has an appointment with Dr. Carroll and she may see Dr. Davis, since they are neighbors.    The patient was counseled to remain active, but avoid activities that worsen symptoms. The patient was in agreement with this plan. All questions were answered to the best of my ability.    PATIENT EDUCATION:  Education was discussed at today's appointment. A learning needs assessment was performed.    Primary learner: Hannah Wellington  Barriers to learning: None  Preferred language: English  Learning preferences include: Seeing and doing.  Discussed: Diagnosis and treatment plan.  Demonstrated: Understanding of material discussed.  Patient education materials given: None.  Learner response: Learner demonstrated understanding.    This note was dictated using Dragon speech recognition software and was not corrected for spelling or grammatical errors.    Patient seen and examined with PM&R resident, Dr. Ruiz. History, physical examination, pertinent imaging findings and the plan of care were discussed and I performed the key portions of the history, physical examination, and discussion of the plan of care. I have edited her note and agree with the findings.      Rosalee Aiken MD  [facility] [place]   and [facility]         "

## 2024-10-01 ENCOUNTER — HOSPITAL ENCOUNTER (OUTPATIENT)
Dept: RADIOLOGY | Facility: HOSPITAL | Age: 74
Discharge: HOME | End: 2024-10-01
Payer: MEDICARE

## 2024-10-01 DIAGNOSIS — M16.12 PRIMARY OSTEOARTHRITIS OF LEFT HIP: ICD-10-CM

## 2024-10-01 PROCEDURE — 73721 MRI JNT OF LWR EXTRE W/O DYE: CPT | Mod: LEFT SIDE | Performed by: RADIOLOGY

## 2024-10-01 PROCEDURE — 73721 MRI JNT OF LWR EXTRE W/O DYE: CPT | Mod: LT

## 2024-10-16 ENCOUNTER — TREATMENT (OUTPATIENT)
Dept: PHYSICAL THERAPY | Facility: CLINIC | Age: 74
End: 2024-10-16
Payer: MEDICARE

## 2024-10-16 DIAGNOSIS — M25.552 CHRONIC LEFT HIP PAIN: ICD-10-CM

## 2024-10-16 DIAGNOSIS — G89.29 CHRONIC LEFT HIP PAIN: ICD-10-CM

## 2024-10-16 PROCEDURE — 97110 THERAPEUTIC EXERCISES: CPT | Mod: GP,CQ | Performed by: SPECIALIST/TECHNOLOGIST

## 2024-10-16 PROCEDURE — 97112 NEUROMUSCULAR REEDUCATION: CPT | Mod: GP,CQ | Performed by: SPECIALIST/TECHNOLOGIST

## 2024-10-16 NOTE — PROGRESS NOTES
"Physical Therapy  Physical Therapy Treatment    Patient Name: Hannah Wellington  MRN: [id number]  Today's Date: 10/16/2024  Time Calculation  Start Time: 1115  Stop Time: 1200  Time Calculation (min): 45 min    Insurance:  Visit number: 7 of MN  Authorization info: NAN  Insurance Type: Medicare and [place]  Cert start date: 7/9/24; Cert end date: 10/7/24      General:  Reason for visit:   M25.552 (ICD-10-CM) - Pain in left hip   M76.892 (ICD-10-CM) - Hip abductor tendonitis, left      Referred by: Dr. Kristian Dougherty    Current Problem  1. Chronic left hip pain  Follow Up In Physical Therapy          Precautions:        Subjective:  Subjective   Patient arrived full weight bearing noting she has no pain on arrival and no soreness after last session.      Pain       Performing HEP?: Yes    Objective:  Objective   Ext lag present R/L  No anterior innominate shift  LLD 1/4\"  TTP L proximal rectus  Hip ext 1/2\"   MMT hip ext R 4+ L 4     Treatments:  Exercise  Stepper L2 5 min   DBE 2x2 min  valslide HIR/HER 15x R/L/B    1/2 roll Hip hike R/L 15x  DBE 2x2 min   - 4 kg KB narrow base & swing cw/ccw 10x R/L   - 8 kg KB ISO SB R/L 1'   - Bravo 7.5# iso rot R/L 1'   - Hip ext R/L 55#/55# 20x   - SS hams R/L 1'   - SS R/ HF 1'  - SS R/L Quads 1'   HEP: add SL hip extension, pilates ceiling kick, AP kick    Access Code [email or web address]    Charges: TE x2, NME     Assessment: Patient tolerated intensity noting challenge of hip abduction work.         Plan: Continue to improve core stability/strength, flexibility and balance to improve gait and ADL  Patient has 1 more strengthening session followed by a re-check with Cindy Saeed, PT      Aryan Guerin, PTA  "

## 2024-10-21 ENCOUNTER — HOSPITAL ENCOUNTER (OUTPATIENT)
Dept: RADIOLOGY | Facility: EXTERNAL LOCATION | Age: 74
Discharge: HOME | End: 2024-10-21

## 2024-10-21 ENCOUNTER — OFFICE VISIT (OUTPATIENT)
Dept: ORTHOPEDIC SURGERY | Facility: HOSPITAL | Age: 74
End: 2024-10-21
Payer: MEDICARE

## 2024-10-21 VITALS — HEIGHT: 61 IN | WEIGHT: 118 LBS | BODY MASS INDEX: 22.28 KG/M2

## 2024-10-21 DIAGNOSIS — M67.952 TENDINOPATHY OF LEFT GLUTEAL REGION: Primary | ICD-10-CM

## 2024-10-21 PROCEDURE — 76942 ECHO GUIDE FOR BIOPSY: CPT | Performed by: EMERGENCY MEDICINE

## 2024-10-21 PROCEDURE — 3008F BODY MASS INDEX DOCD: CPT | Performed by: EMERGENCY MEDICINE

## 2024-10-21 PROCEDURE — 99214 OFFICE O/P EST MOD 30 MIN: CPT | Performed by: EMERGENCY MEDICINE

## 2024-10-21 PROCEDURE — 2500000004 HC RX 250 GENERAL PHARMACY W/ HCPCS (ALT 636 FOR OP/ED): Performed by: STUDENT IN AN ORGANIZED HEALTH CARE EDUCATION/TRAINING PROGRAM

## 2024-10-21 PROCEDURE — 1159F MED LIST DOCD IN RCRD: CPT | Performed by: EMERGENCY MEDICINE

## 2024-10-21 PROCEDURE — 76942 ECHO GUIDE FOR BIOPSY: CPT | Performed by: STUDENT IN AN ORGANIZED HEALTH CARE EDUCATION/TRAINING PROGRAM

## 2024-10-21 ASSESSMENT — PAIN - FUNCTIONAL ASSESSMENT: PAIN_FUNCTIONAL_ASSESSMENT: 0-10

## 2024-10-21 ASSESSMENT — PAIN SCALES - GENERAL: PAINLEVEL_OUTOF10: 3

## 2024-10-21 NOTE — PROGRESS NOTES
"Buck Wellington \"Colleen\" is a 74 y.o. female who presents for Pain of the Left Hip    HPI    Patient is a 74-year-old female presents with continued left lateral and posterior hip pain.  She states that she initially saw Dr. Dougherty who provided her with a prescription for physical therapy and Voltaren gel due to concerns for gluteus medius/minimus tendinosis.  She did have approximately 75% improvement, and followed up with Dr. Aiken and requested MRI imaging.  She underwent MRI imaging and has continued physical therapy and the Voltaren gel.  She notes that the pain is still on the lateral aspect of her hip with radiation posteriorly into her gluteus region.  Denies any numbness tingling or weakness.  Denies any pain from her back down her leg.  The pain is worse when going from sitting to standing or bending over.  It is better with physical therapy and stretching.  She is questioning whether there are any other interventions that would be available for her.    ROS: All pertinent positive symptoms are included in the history of present illness.    All other systems have been reviewed and are negative and noncontributory to this patient's current ailments.    Objective     Vitals:    10/21/24 1357   Weight: 53.5 kg (118 lb)   Height: 1.549 m (5' 1\")       Physical Exam  Musculoskeletal:      Left hip: Tenderness present.   Neurological:      Gait: Gait is intact.       Hip Musculoskeletal Exam  Gait    Gait is normal.    Inspection    Right        Right hip inspection is normal.    Left        Left hip inspection is normal.    Palpation    Left      Tenderness: present        Greater trochanteric region pain: moderate    Palpation additional comments: Tenderness along the LEFT gluteus medius, gluteus minimus, piriformis and the tendinous attachments overlying the greater trochanter.    Range of Motion    Right      Right hip range of motion is within functional limits.     Left      Left hip " range of motion is within functional limits.     Strength    Right      Right hip strength is normal.     Left      Left hip strength is normal.     Neurovascular    Right        Right hip neurovascular exam is normal.    Left        Left hip neurovascular exam is normal.    Special Tests    Right      Log roll test: negative      AGUS test (right): negative      Impingement test: negative    Left      Log roll test: negative      AGUS test (left): negative      Impingement test: negative    Special tests additional comments: Positive Tony Bilaterally        Imaging:                       I have personally reviewed and agree with Radiologist interpretation of previous imaging studies performed prior to this visit. I have included further imaging and interpretation as discussed below.   ===10/01/2024===  MRI left hip without IV contrast.   Individual interpretation: Bilateral gluteus medius and minimus tendinosis with mild osteoarthritic changes in the bilateral hip joints.  No acute fracture or dislocation.  === 05/29/24 ===  XR HIP LEFT WITH PELVIS WHEN PERFORMED 2 OR 3 VIEWS  - Impression -  Moderate osteoarthritis bilateral hips and sacroiliac joints with no  acute findings left hip.  Signed by: Rajiv Barrios 5/30/2024 8:07 AM    Tendon Sheath Injection (Greater trochanter - gluteus medius and minimus) on 10/21/2024 4:47 PM  Indications: pain, diagnostic and therapeutic benefit  Details: 25 G needle, ultrasound-guided lateral approach  Medications: 80 mg triamcinolone acetonide 40 mg/mL; 2 mL lidocaine 10 mg/mL (1 %)  Procedure, treatment alternatives, risks and benefits explained, specific risks discussed. Consent was given by the patient. Immediately prior to procedure a time out was called to verify the correct patient, procedure, equipment, support staff and site/side marked as required. Patient was prepped and draped in the usual sterile fashion.            Assessment/Plan   Problem List Items Addressed This  Visit    None  Visit Diagnoses       Tendinopathy of left gluteal region    -  Primary    Relevant Orders    Point of Care Ultrasound (Completed)            Patient is a 74-year-old female presents with left lateral hip pain.  Prior workup and evaluation concerning for gluteus minimus and medius tendinitis.  MRI showing evidence of this.  History and exam also consistent with lateral hip pain most likely gluteus minimus and medius tendinosis, but could consider lateral hip fatigue syndrome with osteoarthritic changes on the MRI although limited exam findings for suspected internal derangement.  Discussed options including continuing Voltaren and physical therapy as it seems to be helping, as well as diagnostic and therapeutic CSI.  We opted for diagnostic and therapeutic injection at this time to further address patient's pain has been ongoing since May.  Patient tolerated this well.  We will have her follow-up in 3 months or sooner pending efficacy of the injection.  If she did not get adequate relief from the injection, we will trial intra-articular hip injection.  If she did get good relief can discuss Tenex procedure.    Sergio Cardona, DO  [facility]

## 2024-10-23 ENCOUNTER — TREATMENT (OUTPATIENT)
Dept: PHYSICAL THERAPY | Facility: CLINIC | Age: 74
End: 2024-10-23
Payer: MEDICARE

## 2024-10-23 DIAGNOSIS — M25.552 CHRONIC LEFT HIP PAIN: ICD-10-CM

## 2024-10-23 DIAGNOSIS — G89.29 CHRONIC LEFT HIP PAIN: ICD-10-CM

## 2024-10-23 PROCEDURE — 97112 NEUROMUSCULAR REEDUCATION: CPT | Mod: GP,CQ | Performed by: SPECIALIST/TECHNOLOGIST

## 2024-10-23 PROCEDURE — 97110 THERAPEUTIC EXERCISES: CPT | Mod: GP,CQ | Performed by: SPECIALIST/TECHNOLOGIST

## 2024-10-23 RX ORDER — TRIAMCINOLONE ACETONIDE 40 MG/ML
80 INJECTION, SUSPENSION INTRA-ARTICULAR; INTRAMUSCULAR
Status: COMPLETED | OUTPATIENT
Start: 2024-10-21 | End: 2024-10-21

## 2024-10-23 RX ORDER — LIDOCAINE HYDROCHLORIDE 10 MG/ML
2 INJECTION, SOLUTION INFILTRATION; PERINEURAL
Status: COMPLETED | OUTPATIENT
Start: 2024-10-21 | End: 2024-10-21

## 2024-10-23 ASSESSMENT — PAIN SCALES - GENERAL: PAINLEVEL_OUTOF10: 0 - NO PAIN

## 2024-10-23 ASSESSMENT — PAIN - FUNCTIONAL ASSESSMENT: PAIN_FUNCTIONAL_ASSESSMENT: 0-10

## 2024-10-23 NOTE — PROGRESS NOTES
"Physical Therapy  Physical Therapy Treatment    Patient Name: Hannah Wellington  MRN: [id number]  Today's Date: 10/23/2024  Time Calculation  Start Time: 1115  Stop Time: 1158  Time Calculation (min): 43 min    Insurance:  Visit number: 7 of [place]  Authorization info: NAN  Insurance Type: Medicare and [place]  Cert start date: 7/9/24; Cert end date: 10/7/24      General:  Reason for visit:   M25.552 (ICD-10-CM) - Pain in left hip   M76.892 (ICD-10-CM) - Hip abductor tendonitis, left      Referred by: Dr. Kristian Dougherty    Current Problem  1. Chronic left hip pain  Follow Up In Physical Therapy          Precautions:   Precautions  STEADI Fall Risk Score (The score of 4 or more indicates an increased risk of falling): 0  Precautions Comment: Recent parathyroid surgery (no swimming, no lifting >5 lb)    Subjective:  Subjective   Patient saw Dr. Carroll and had a hip injection which has substantially decreased symptoms.  Patient also very pleased with way appointment with Dr. Carroll went to determine what is happening with her hip.  Patient arrived full weight bearing without a limp moving at a faster pace.  Patient did have some discomfort for 2-3 days after last session.     Pain  Pain Assessment: 0-10  0-10 (Numeric) Pain Score: 0 - No pain    Performing HEP?: Yes    Objective:  Objective   Ext lag present R/L  No anterior innominate shift  LLD 1/4\"  TTP L proximal rectus  Hip ext 1/2\"   MMT hip ext R 4+ L 4     Treatments:  Exercise  Stepper L2 5 min   DBE 2x2 min  valslide HIR/HER 15x R/L/B    1/2 roll Hip hike R/L 15x  4 kg KB narrow base & swing cw/ccw 10x R/L   8 kg KB ISO SB R/L 1'   Bravo 7.5# iso rot R/L 1'   Hip ext R/L 55#/55# 20x   Hams 20# 20x  - SS hams R/L 1'   - SS R/ HF 1'  - SS R/L Quads 1'   HEP: add SL hip extension, pilates ceiling kick, AP kick    Access Code [email or web address]    Charges: TE x2, NME     Assessment: Patient tolerated intensity noting challenge of hip abduction work.         Plan: Continue to " improve core stability/strength, flexibility and balance to improve gait and ADL  Patient has re-check with Cindy Saeed, PT next session (Discharge vs extended time follow-ups (ie 1x/month for 1-2 sessions to verify compliance and further improvement).       Aryan Guerin, PTA

## 2024-11-05 NOTE — PROGRESS NOTES
"Physical Therapy  Physical Therapy Orthopedic Progress Note    Patient Name: Hannah Wellington \"Colleen\"  MRN: [id number]  Today's Date: 11/6/2024  Time Calculation  Start Time: 1147  Stop Time: 1211  Time Calculation (min): 24 min    Insurance:  Visit number: 8 of [place]  Authorization info: NAN  Insurance Type: Medicare and [place]  Cert start date: 10/8/24; Cert end date: 1/6/25     General:  Reason for visit:   M25.552 (ICD-10-CM) - Pain in left hip   M76.892 (ICD-10-CM) - Hip abductor tendonitis, left   Referred by: Dr. Kristian Dougherty    Assessment: Pt demonstrating good progress with PT POC, demonstrating improved hip strength, transfer ability, and gait. Reports decrease in pain. Demonstrates continued L SL balance deficits and L hip ext strength deficits. Recommend continued PT once every other week for 3 visits to progress toward functional goals and establish comprehensive HEP to continue independently.         Plan: Updated 11/6/2024     Planned Interventions include: therapeutic exercise, self-care home management, manual therapy, therapeutic activities, gait training, neuromuscular coordination, vasopneumatic, dry needling, aquatic therapy  Frequency: Once every other week  Duration: 3 visits    Current Problem  1. Chronic left hip pain  Follow Up In Physical Therapy          Precautions:   Precautions  STEADI Fall Risk Score (The score of 4 or more indicates an increased risk of falling): 0  Precautions Comment: Recent parathyroid surgery (no swimming, no lifting >5 lb)      Subjective:  Subjective   Patient reports improvement in symptoms, especially after receiving cortisone injection. Notes improvement with PT prior to injection, however, since injection, no longer experiencing \"freezing/locking\" episodes. Reports HEP adherence.    Current Condition:   Better    Pain:  Pain Assessment: 0-10  0-10 (Numeric) Pain Score: 0 - No pain  Location: L hip  Description: Ache  Aggravating Factors: Gardening, " tennis  Relieving Factors:  Rest  Pain Intensity: 0/10-3/10    Self Reported Function (0-100%) = 80%  - 100% being back to PLOF    Objective:  Objective   Lower Extremity MMT  Hip Flexion R 4+/5; L 4+/5  Hip Extension R 4+/5; L 4/5  Hip Abduction R 4+/5; L 4+/5  Hip IR R 4+/5; L 4+/5  Hip ER R 4+/5; L 4+/5    Gait Analysis: Unremarkable  Transfers: Equal weightbearing without UE    Balance:   SL Balance: R 30s+; L 15s    Joint Mobility: WFL    Outcome Measures: Updated 11/6/2024    Patient Specific Functional Scale (0 = unable to perform; 10 = able to perform activity at same level as before injury or problem)  Activity Current Follow Up Discharge   Walking 5 10    Tennis 0 7          Total score = sum of the activity scores/number of activities  Minimum detectable change (90%CI) for average score = 2 points  Minimum detectable change (90%CI) for single activity score = 3 points    EDUCATION: home exercise program, plan of care, activity modifications, pain management, and injury pathology       Goals: Updated 11/6/2024  Active       PT Problem       STG (Met)       Start:  07/09/24    Expected End:  08/23/24    Resolved:  11/06/24    Patient demonstrate home exercise program adherence to supplement symptom reduction and functional gains made in clinic          LTG       Start:  07/09/24    Expected End:  10/07/24       Patient will verbalize pain (0-10) <1/10 at worst to improve ADL tolerance - PROGRESSING  Patient will demonstrate gross L hip strength 4/5 MMT to improve ease with functional mobility - MET  Patient will perform STS transfer with appropriate LE alignment with minimal symptoms to improve ease with transfers - MET  Pt will demonstrate MCID improvement on PSFS to demonstrate return to PLOF - MET               Treatments:   Recheck performed, see objective measures  Educated on findings, discussed POC moving forward  Provided aquatic HEP    Cindy Saeed, PT

## 2024-11-06 ENCOUNTER — TREATMENT (OUTPATIENT)
Dept: PHYSICAL THERAPY | Facility: CLINIC | Age: 74
End: 2024-11-06
Payer: MEDICARE

## 2024-11-06 DIAGNOSIS — M25.552 CHRONIC LEFT HIP PAIN: ICD-10-CM

## 2024-11-06 DIAGNOSIS — G89.29 CHRONIC LEFT HIP PAIN: ICD-10-CM

## 2024-11-06 PROCEDURE — 97110 THERAPEUTIC EXERCISES: CPT | Mod: GP | Performed by: PHYSICAL THERAPIST

## 2024-11-06 ASSESSMENT — PAIN - FUNCTIONAL ASSESSMENT: PAIN_FUNCTIONAL_ASSESSMENT: 0-10

## 2024-11-06 ASSESSMENT — PAIN SCALES - GENERAL: PAINLEVEL_OUTOF10: 0 - NO PAIN

## 2024-11-06 NOTE — LETTER
November 6, 2024    [place]  [address] [facility] [address]    Patient: Colleen Wellington   YOB: 1950   Date of Visit: 11/6/2024       Dear Kristian Dougherty MD  [address] [address]    The attached plan of care is being sent to you because your patient’s medical reimbursement requires that you certify the plan of care. Your signature is required to allow uninterrupted insurance coverage.      You may indicate your approval by signing below and faxing this form back to us at Dept Fax: [phone].    Please call Dept: [phone] with any questions or concerns.    Thank you for this referral,        Cindy Saeed PT  [facility] [address] [facility]  [address] [facility] [address]    Payer: Payor: MEDICARE / Plan: MEDICARE PART A AND B / Product Type: *No Product type* /                                                                         Date:     Dear Cindy Saeed PT,     Re: Ms. Hannah Wellington, MRN:[id number]    I certify that I have reviewed the attached plan of care and it is medically necessary for Ms. Hannah Wellington (1950) who is under my care.          ______________________________________                    _________________  Provider name and credentials                                           Date and time                                                                                           Plan of Care 10/8/24   Effective from: 10/8/2024  Effective to: 1/6/2025    Plan ID: [id number]            Participants as of Finalize on 11/6/2024    Name Type Comments Contact Info    Kristian Dougherty MD Referring Provider  [phone]    Cindy Saeed PT Physical Therapist  [phone]       Last Plan Note     Author: Cindy Saeed PT Status: Incomplete Last edited: 11/6/2024 11:45 AM       Physical Therapy  Physical Therapy Orthopedic Progress Note    Patient Name: Hannah DURAND  "Wellington \"Colleen\"  MRN: [id number]  Today's Date: 11/6/2024       Insurance:  Visit number: 8 of [place]  Authorization info: NAN  Insurance Type: Medicare and [place]  Cert start date: 11/6/24; Cert end date: 10/7/24      General:  Reason for visit:   M25.552 (ICD-10-CM) - Pain in left hip   M76.892 (ICD-10-CM) - Hip abductor tendonitis, left   Referred by: Dr. Kristian Dougherty    Assessment: Pt demonstrating good progress with PT POC, demonstrating improved hip strength, transfer ability, and gait. Reports decrease in pain. Demonstrates continued L SL balance deficits and L hip ext strength deficits. Recommend continued PT once every other week for 3 visits to progress toward functional goals and establish comprehensive HEP to continue independently.         Plan: Updated 11/6/2024     Planned Interventions include: therapeutic exercise, self-care home management, manual therapy, therapeutic activities, gait training, neuromuscular coordination, vasopneumatic, dry needling, aquatic therapy  Frequency: Once every other week  Duration: 3 visits    Current Problem  1. Chronic left hip pain  Follow Up In Physical Therapy          Precautions:   Precautions  STEADI Fall Risk Score (The score of 4 or more indicates an increased risk of falling): 0  Precautions Comment: Recent parathyroid surgery (no swimming, no lifting >5 lb)      Subjective:  Subjective  Patient reports improvement in symptoms, especially after receiving cortisone injection. Notes improvement with PT prior to injection, however, since injection, no longer experiencing \"freezing/locking\" episodes. Reports HEP adherence.    Current Condition:   Better    Pain:  Pain Assessment: 0-10  0-10 (Numeric) Pain Score: 0 - No pain  Location: L hip  Description: Ache  Aggravating Factors: Gardening, tennis  Relieving Factors:  Rest  Pain Intensity: 0/10-3/10    Self Reported Function (0-100%) = 80%  - 100% being back to PLOF    Objective:  Objective  Lower Extremity MMT  Hip " Flexion R 4+/5; L 4+/5  Hip Extension R 4+/5; L 4/5  Hip Abduction R 4+/5; L 4+/5  Hip IR R 4+/5; L 4+/5  Hip ER R 4+/5; L 4+/5    Gait Analysis: Unremarkable  Transfers: Equal weightbearing without UE    Balance:   SL Balance: R 30s+; L 15s    Joint Mobility: WFL    Outcome Measures: Updated 11/6/2024    Patient Specific Functional Scale (0 = unable to perform; 10 = able to perform activity at same level as before injury or problem)  Activity Current Follow Up Discharge   Walking 5 10    Tennis 0 7          Total score = sum of the activity scores/number of activities  Minimum detectable change (90%CI) for average score = 2 points  Minimum detectable change (90%CI) for single activity score = 3 points    EDUCATION: home exercise program, plan of care, activity modifications, pain management, and injury pathology       Goals: Updated 11/6/2024  Active       PT Problem       STG       Start:  07/09/24    Expected End:  08/23/24       Patient demonstrate home exercise program adherence to supplement symptom reduction and functional gains made in clinic          LTG       Start:  07/09/24    Expected End:  10/07/24       Patient will verbalize pain (0-10) <1/10 at worst to improve ADL tolerance - PROGRESSING  Patient will demonstrate gross L hip strength 4/5 MMT to improve ease with functional mobility - MET  Patient will perform STS transfer with appropriate LE alignment with minimal symptoms to improve ease with transfers - MET  Pt will demonstrate MCID improvement on PSFS to demonstrate return to PLOF - MET               Treatments:   Recheck performed, see objective measures  Educated on findings, discussed POC moving forward  Provided aquatic HEP    Cindy Saeed PT         Current Participants as of 11/6/2024    Name Type Comments Contact Info    Kristian Dougherty MD Referring Provider  [phone]    Signature pending    Cindy Saeed PT Physical Therapist  [phone]    Signature pending

## 2024-11-20 ENCOUNTER — TREATMENT (OUTPATIENT)
Dept: PHYSICAL THERAPY | Facility: CLINIC | Age: 74
End: 2024-11-20
Payer: MEDICARE

## 2024-11-20 DIAGNOSIS — M25.552 CHRONIC LEFT HIP PAIN: ICD-10-CM

## 2024-11-20 DIAGNOSIS — G89.29 CHRONIC LEFT HIP PAIN: ICD-10-CM

## 2024-11-20 PROCEDURE — 97110 THERAPEUTIC EXERCISES: CPT | Mod: GP,CQ | Performed by: SPECIALIST/TECHNOLOGIST

## 2024-11-20 ASSESSMENT — PAIN - FUNCTIONAL ASSESSMENT: PAIN_FUNCTIONAL_ASSESSMENT: 0-10

## 2024-11-20 ASSESSMENT — PAIN SCALES - GENERAL: PAINLEVEL_OUTOF10: 0 - NO PAIN
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
## 2024-12-04 ENCOUNTER — DOCUMENTATION (OUTPATIENT)
Dept: PHYSICAL THERAPY | Facility: CLINIC | Age: 74
End: 2024-12-04

## 2024-12-04 ENCOUNTER — TREATMENT (OUTPATIENT)
Dept: PHYSICAL THERAPY | Facility: CLINIC | Age: 74
End: 2024-12-04
Payer: MEDICARE

## 2024-12-04 DIAGNOSIS — G89.29 CHRONIC LEFT HIP PAIN: ICD-10-CM

## 2024-12-04 DIAGNOSIS — M25.552 CHRONIC LEFT HIP PAIN: ICD-10-CM

## 2024-12-04 PROCEDURE — 97113 AQUATIC THERAPY/EXERCISES: CPT | Mod: GP,CQ | Performed by: SPECIALIST/TECHNOLOGIST

## 2024-12-04 ASSESSMENT — PAIN SCALES - GENERAL: PAINLEVEL_OUTOF10: 0 - NO PAIN

## 2024-12-04 ASSESSMENT — PAIN - FUNCTIONAL ASSESSMENT: PAIN_FUNCTIONAL_ASSESSMENT: 0-10

## 2024-12-04 NOTE — PROGRESS NOTES
"Discharge Summary    Name: Hannah Wellington \"Colleen\"  MRN: [id number]  : 1950  Date: 24    Discharge Summary: PT    Discharge Information: Date of discharge 24, Date of last visit 24, Date of evaluation 24, Number of attended visits 9, Referred by Dr. Dougherty, and Referred for Pain in L hip; hip abd tendonitis, L    Therapy Summary: Pt demonstrating great progress with PT POC.    Discharge Status: See reevaluation 24.      Rehab Discharge Reason: Achieved all and/or the most significant goals(s)      "

## 2024-12-04 NOTE — PROGRESS NOTES
"Physical Therapy  Physical Therapy Treatment    Patient Name: Hannah Wellington  MRN: [id number]  Today's Date: 12/4/2024  Time Calculation  Start Time: 0945  Stop Time: 1035  Time Calculation (min): 50 min    Insurance:  Visit number: [facility]  Authorization info: NAN  Insurance Type: Medicare and [place]  Cert start date: 7/9/24; Cert end date: 10/7/24      General:  Reason for visit:   M25.552 (ICD-10-CM) - Pain in left hip   M76.892 (ICD-10-CM) - Hip abductor tendonitis, left      Referred by: Dr. Kristian Dougherty    Current Problem  1. Chronic left hip pain  Follow Up In Physical Therapy          Precautions:   Precautions  STEADI Fall Risk Score (The score of 4 or more indicates an increased risk of falling): 0  Precautions Comment: Recent parathyroid surgery (no swimming, no lifting >5 lb)    Subjective:  Subjective   Patient notes feeling good without any pain on arrival.  Patient ready for aquatic learning session today.     Pain  Pain Assessment: 0-10  0-10 (Numeric) Pain Score: 0 - No pain    Performing HEP?: Yes    Objective:  Objective   Ext lag present R/L  No anterior innominate shift  LLD 1/4\"  TTP L proximal rectus  Hip ext 1/2\"   MMT hip ext R 4+ L 4     Treatments:  Pool Depth: 4 foot, Temperature 92°  Forward walk 3'   Retro walk 3'  Side Step 4'   Heel raise /TR 2'  Noodle deep water 5'  Bicycle 4'  [place] 4'  Cross country ski 4'   Scissors 4'  Hang 3'    4 foot   Floats: Shoulder extension R/L - 2' wide base 2' horiz abd   Standing HF stretch R/L 1'  Standing [place] stretch on stair R/L 1'      Pool HEP: Access Code: [email or web address]  Access Code [email or web address]    Charges: AQ x3    Assessment: Patient tolerated intensity of aquatic exercise without problems. Patient understands aquatic program and feels she can self manage.     Plan: Patient would like to be discharged today doing very well.     Aryan Guerin PTA  "

## 2024-12-18 ENCOUNTER — APPOINTMENT (OUTPATIENT)
Dept: PHYSICAL THERAPY | Facility: CLINIC | Age: 74
End: 2024-12-18
Payer: MEDICARE

## 2024-12-18 DIAGNOSIS — G89.29 CHRONIC LEFT HIP PAIN: ICD-10-CM

## 2024-12-18 DIAGNOSIS — M25.552 CHRONIC LEFT HIP PAIN: ICD-10-CM

## 2025-04-25 ENCOUNTER — OFFICE VISIT (OUTPATIENT)
Dept: URGENT CARE | Age: 75
End: 2025-04-25
Payer: MEDICARE

## 2025-04-25 VITALS
OXYGEN SATURATION: 96 % | WEIGHT: 117 LBS | HEIGHT: 61 IN | HEART RATE: 68 BPM | RESPIRATION RATE: 19 BRPM | DIASTOLIC BLOOD PRESSURE: 76 MMHG | SYSTOLIC BLOOD PRESSURE: 134 MMHG | TEMPERATURE: 97.6 F | BODY MASS INDEX: 22.09 KG/M2

## 2025-04-25 DIAGNOSIS — L53.9 ERYTHEMA: Primary | ICD-10-CM

## 2025-04-25 PROCEDURE — 1159F MED LIST DOCD IN RCRD: CPT | Performed by: PHYSICIAN ASSISTANT

## 2025-04-25 PROCEDURE — 1036F TOBACCO NON-USER: CPT | Performed by: PHYSICIAN ASSISTANT

## 2025-04-25 PROCEDURE — 1160F RVW MEDS BY RX/DR IN RCRD: CPT | Performed by: PHYSICIAN ASSISTANT

## 2025-04-25 PROCEDURE — 10120 INC&RMVL FB SUBQ TISS SMPL: CPT | Performed by: PHYSICIAN ASSISTANT

## 2025-04-25 PROCEDURE — 99203 OFFICE O/P NEW LOW 30 MIN: CPT | Performed by: PHYSICIAN ASSISTANT

## 2025-04-25 PROCEDURE — 87631 RESP VIRUS 3-5 TARGETS: CPT | Performed by: PHYSICIAN ASSISTANT

## 2025-04-25 RX ORDER — DOXYCYCLINE 100 MG/1
100 CAPSULE ORAL 2 TIMES DAILY
Qty: 2 CAPSULE | Refills: 0 | Status: SHIPPED | OUTPATIENT
Start: 2025-04-25 | End: 2025-04-26

## 2025-04-25 ASSESSMENT — ENCOUNTER SYMPTOMS
DIAPHORESIS: 0
CHILLS: 0
COLOR CHANGE: 1
SHORTNESS OF BREATH: 0
FEVER: 0

## 2025-04-25 NOTE — PROGRESS NOTES
"Subjective   Patient ID: Hannah Wellington \"Colleen\" is a 75 y.o. female. They present today with a chief complaint of Insect Bite (Right arm ).    History of Present Illness  Possible \"Insect bite\" right forearm started 2 days ago, after working in garden.    Denies any other symptoms.         Past Medical History  Allergies as of 04/25/2025 - Reviewed 04/25/2025   Allergen Reaction Noted    Penicillins GI Upset and Unknown 03/01/2018       Prescriptions Prior to Admission[1]     Medical History[2]    Surgical History[3]     reports that she has never smoked. She has never used smokeless tobacco. She reports current alcohol use. She reports that she does not use drugs.    Review of Systems  Review of Systems   Constitutional:  Negative for chills, diaphoresis and fever.   Respiratory:  Negative for shortness of breath.    Cardiovascular:  Negative for chest pain.   Skin:  Positive for color change.   All other systems reviewed and are negative.                                 Objective    Vitals:    04/25/25 1333   BP: 134/76   Pulse: 68   Resp: 19   Temp: 36.4 °C (97.6 °F)   TempSrc: Oral   SpO2: 96%   Weight: 53.1 kg (117 lb)   Height: 1.549 m (5' 1\")     No LMP recorded (approximate). Patient is postmenopausal.    Physical Exam  Constitutional:       General: She is not in acute distress.  Cardiovascular:      Rate and Rhythm: Normal rate.   Pulmonary:      Effort: Pulmonary effort is normal.   Skin:     Findings: Erythema present.      Comments: 0.6 cm hannah erythematous area right mid dorsal forearm with black center. No erythema migrans.    Neurological:      Mental Status: She is alert.         Embedded Foreign Body Removal    Date/Time: 4/25/2025 3:33 PM    Performed by: Marisela Archibald PA-C  Authorized by: Marisela Archibald PA-C    Consent:     Consent obtained:  Verbal    Consent given by:  Patient    Risks, benefits, and alternatives were discussed: yes      Risks discussed:  Bleeding and pain  Location:     " Location:  Arm    Arm location:  R forearm    Depth:  Intradermal    Tendon involvement:  None  Anesthesia:     Anesthesia method:  None  Procedure type:     Procedure complexity:  Simple  Procedure details:     Removal mechanism: 18 gauge needle and splinter tweezer.    Foreign bodies recovered:  1    Description:  Black speck, likely borrero being small thorn, no body or insect antenna visualized.  Post-procedure details:     Confirmation:  No additional foreign bodies on visualization    Dressing:  Antibiotic ointment and adhesive bandage    Procedure completion:  Tolerated well, no immediate complications  Comments:      Area cleaned with alcohol wipes pre and post procedure.       Point of Care Test & Imaging Results from this visit  Results for orders placed or performed in visit on 04/25/25   POCT SPOTFIRE R/ST Panel Mini w/COVID ([employer]) manually resulted    Specimen: Swab   Result Value Ref Range    POC Sars-Cov-2 PCR Negative Negative    POC Respiratory Syncytial Virus PCR Negative Negative    POC Influenza A Virus PCR Negative Negative    POC Influenza B Virus PCR Negative Negative    POC Human Rhinovirus PCR Negative Negative      Imaging  No results found.    Cardiology, Vascular, and Other Imaging  No other imaging results found for the past 2 days      Diagnostic study results (if any) were reviewed by Marisela Archibald PA-C.    Assessment/Plan   Allergies, medications, history, and pertinent labs/EKGs/Imaging reviewed by Marisela Archibald PA-C.     Orders and Diagnoses  Diagnoses and all orders for this visit:  Cough, unspecified type  -     POCT SPOTFIRE R/ST Panel Mini w/COVID ([facility]) manually resulted      Medical Admin Record      Patient disposition: Home    Electronically signed by Marisela Arcihbald PA-C  1:55 PM           [1] (Not in a hospital admission)  [2] History reviewed. No pertinent past medical history.  [3] History reviewed. No pertinent surgical history.